# Patient Record
Sex: MALE | Race: WHITE | NOT HISPANIC OR LATINO
[De-identification: names, ages, dates, MRNs, and addresses within clinical notes are randomized per-mention and may not be internally consistent; named-entity substitution may affect disease eponyms.]

---

## 2023-01-30 PROBLEM — Z00.00 ENCOUNTER FOR PREVENTIVE HEALTH EXAMINATION: Status: ACTIVE | Noted: 2023-01-30

## 2023-02-15 ENCOUNTER — APPOINTMENT (OUTPATIENT)
Dept: ORTHOPEDIC SURGERY | Facility: CLINIC | Age: 38
End: 2023-02-15
Payer: OTHER MISCELLANEOUS

## 2023-02-15 VITALS — HEIGHT: 73 IN | WEIGHT: 215 LBS | BODY MASS INDEX: 28.49 KG/M2

## 2023-02-15 DIAGNOSIS — Z78.9 OTHER SPECIFIED HEALTH STATUS: ICD-10-CM

## 2023-02-15 PROCEDURE — 99205 OFFICE O/P NEW HI 60 MIN: CPT

## 2023-02-15 PROCEDURE — 73010 X-RAY EXAM OF SHOULDER BLADE: CPT | Mod: LT

## 2023-02-15 PROCEDURE — 99072 ADDL SUPL MATRL&STAF TM PHE: CPT

## 2023-02-15 PROCEDURE — 73030 X-RAY EXAM OF SHOULDER: CPT | Mod: LT

## 2023-02-16 NOTE — DATA REVIEWED
[FreeTextEntry1] : X-rays done in the office today of the left shoulder 5 views including a weightbearing view show mild peripheral spurring but no narrowing of the joint.  There is no proximal migration.  There is no tumors masses or calcifications seen.\par \par [facility] from [facility] from November 2022 shows a superior labral tear.  There is also mild to moderate arthritis and fluid both in the subscapularis bursa and in the joint.

## 2023-02-16 NOTE — IMAGING
[de-identified] : Examination of the left upper extremity reveals normal neurovascular exam.  Examination of the left shoulder reveals forward elevation to 150 degrees external rotation to 60 degrees and internal rotation to T10 with pain through the arc of motion.  He has 5 out of 5 strength of deltoid and rotator cuff.  He has mildly positive Neer and Schmidt impingement signs.  He has an equivocal speeds test.  He has a negative cross chest sign and no pain over the AC joint.  There is no redness or rashes or visible scars.
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
## 2023-02-16 NOTE — DISCUSSION/SUMMARY
[de-identified] : Plan at this point is to get authorization for physical therapy for the left shoulder 3 times a week for 6 weeks.  He may ultimately be a candidate for an arthroscopy of the left shoulder with debridement and possible biceps tenodesis.  He will continue to work without restrictions.  He has a 30% temporary partial disability to the left shoulder.  His left shoulder pain is causally related to his work accident.
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
## 2023-03-01 ENCOUNTER — FORM ENCOUNTER (OUTPATIENT)
Age: 38
End: 2023-03-01

## 2023-06-08 ENCOUNTER — APPOINTMENT (OUTPATIENT)
Dept: ORTHOPEDIC SURGERY | Facility: CLINIC | Age: 38
End: 2023-06-08
Payer: OTHER MISCELLANEOUS

## 2023-06-08 VITALS — WEIGHT: 215 LBS | BODY MASS INDEX: 28.49 KG/M2 | HEIGHT: 73 IN

## 2023-06-08 DIAGNOSIS — M75.22 BICIPITAL TENDINITIS, LEFT SHOULDER: ICD-10-CM

## 2023-06-08 DIAGNOSIS — M75.42 IMPINGEMENT SYNDROME OF LEFT SHOULDER: ICD-10-CM

## 2023-06-08 PROCEDURE — 99213 OFFICE O/P EST LOW 20 MIN: CPT | Mod: ACP

## 2023-06-08 NOTE — HISTORY OF PRESENT ILLNESS
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 Pt reports pain levels have decreased since last visit. Previous tx PT with improvement. [de-identified] : 2/15/23 [de-identified] : Dr. Shen [de-identified] : PT [de-identified] : GELACIO BSO
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
